# Patient Record
(demographics unavailable — no encounter records)

---

## 2024-12-06 NOTE — HISTORY OF PRESENT ILLNESS
[5] : 5 [Dull/Aching] : dull/aching [] : Post Surgical Visit: yes [de-identified] : R CTR last week She is feeling better [FreeTextEntry1] : R wrist  [de-identified] : 11/27/24 [de-identified] : R AYUSH

## 2024-12-06 NOTE — PHYSICAL EXAM
[de-identified] : Mild hand swelling Healed incision No evidence of infection Mild tenderness at the surgical site Good finger ROM Improved sensation   L hand:  Tender volar wrist  Good finger ROM  +Tinels  +Phalens  +Compression test  Decreased sensation median nerve distribution

## 2024-12-06 NOTE — ASSESSMENT
[FreeTextEntry1] : Sutures removed Steris applied Light activities and advance as tolerated Return prn  For L CTR R/B/A of surgery discussed with the patient. Risks including but not limited to infection, nerve damage, tendon damage, pain, stiffness, recurrence, no resolution of symptoms, loss of function, limb or life. They understand and agree to surgery  Return post op